# Patient Record
Sex: FEMALE | Race: BLACK OR AFRICAN AMERICAN | URBAN - METROPOLITAN AREA
[De-identification: names, ages, dates, MRNs, and addresses within clinical notes are randomized per-mention and may not be internally consistent; named-entity substitution may affect disease eponyms.]

---

## 2021-05-07 ENCOUNTER — OFFICE VISIT (OUTPATIENT)
Dept: URBAN - METROPOLITAN AREA CLINIC 19 | Facility: CLINIC | Age: 46
End: 2021-05-07
Payer: MEDICARE

## 2021-05-07 ENCOUNTER — WEB ENCOUNTER (OUTPATIENT)
Dept: URBAN - METROPOLITAN AREA CLINIC 19 | Facility: CLINIC | Age: 46
End: 2021-05-07

## 2021-05-07 DIAGNOSIS — R14.3 GAS AND BLOATING: ICD-10-CM

## 2021-05-07 DIAGNOSIS — K92.1 BLOOD IN STOOL: ICD-10-CM

## 2021-05-07 DIAGNOSIS — R10.13 EPIGASTRIC PAIN: ICD-10-CM

## 2021-05-07 DIAGNOSIS — R19.7 DIARRHEA: ICD-10-CM

## 2021-05-07 PROBLEM — 62315008 DIARRHEA: Status: ACTIVE | Noted: 2021-05-07

## 2021-05-07 PROBLEM — 271832001 FLATULENCE, ERUCTATION AND GAS PAIN: Status: ACTIVE | Noted: 2021-05-07

## 2021-05-07 PROCEDURE — 99204 OFFICE O/P NEW MOD 45 MIN: CPT | Performed by: INTERNAL MEDICINE

## 2021-05-07 RX ORDER — SODIUM, POTASSIUM,MAG SULFATES 17.5-3.13G
354ML SOLUTION, RECONSTITUTED, ORAL ORAL
Qty: 354 MILLILITER | Refills: 0 | OUTPATIENT
Start: 2021-05-07 | End: 2021-05-08

## 2021-05-07 NOTE — HPI-TODAY'S VISIT:
Patient presents for evaluation of blood in stool.  Patient states that she has seen intermittent blood mixed in the stool for "years", but she has noticed blood in every BM this week.  She has been told that she has hemorrhoids, but she is not having severe anal pruritus or pain at this time.  She has epigastric pain and takes daily omeprazole for presumed GERD - has some relief.  She also complains of excessive gas.  She states that she had an operation "years ago" to remove a cyst from her intestine.  She has chronic anemia, and she is taking oral iron therapy.

## 2021-05-09 PROBLEM — 79922009 EPIGASTRIC PAIN: Status: ACTIVE | Noted: 2021-05-07

## 2021-05-09 PROBLEM — 405729008 BLOOD IN STOOL: Status: ACTIVE | Noted: 2021-05-07

## 2021-05-14 ENCOUNTER — OFFICE VISIT (OUTPATIENT)
Dept: URBAN - METROPOLITAN AREA MEDICAL CENTER 25 | Facility: MEDICAL CENTER | Age: 46
End: 2021-05-14

## 2021-05-28 ENCOUNTER — OFFICE VISIT (OUTPATIENT)
Dept: URBAN - METROPOLITAN AREA MEDICAL CENTER 25 | Facility: MEDICAL CENTER | Age: 46
End: 2021-05-28
Payer: MEDICARE

## 2021-05-28 ENCOUNTER — LAB OUTSIDE AN ENCOUNTER (OUTPATIENT)
Dept: URBAN - METROPOLITAN AREA CLINIC 19 | Facility: CLINIC | Age: 46
End: 2021-05-28

## 2021-05-28 DIAGNOSIS — R19.7 ACUTE DIARRHEA: ICD-10-CM

## 2021-05-28 DIAGNOSIS — K20.80 ESOPHAGITIS, LOS ANGELES GRADE A: ICD-10-CM

## 2021-05-28 DIAGNOSIS — K29.80 ACUTE DUODENITIS: ICD-10-CM

## 2021-05-28 DIAGNOSIS — K62.5 ANAL BLEEDING: ICD-10-CM

## 2021-05-28 DIAGNOSIS — D12.0 ADENOMA OF CECUM: ICD-10-CM

## 2021-05-28 DIAGNOSIS — B96.81 BACTERIAL INFECTION DUE TO H. PYLORI: ICD-10-CM

## 2021-05-28 DIAGNOSIS — K29.60 ADENOPAPILLOMATOSIS GASTRICA: ICD-10-CM

## 2021-05-28 LAB
GLUCOSE POC: 192
GLUCOSE POC: 239
PERFORMING LAB: (no result)
PERFORMING LAB: (no result)

## 2021-05-28 PROCEDURE — 43239 EGD BIOPSY SINGLE/MULTIPLE: CPT | Performed by: INTERNAL MEDICINE

## 2021-05-28 PROCEDURE — 45380 COLONOSCOPY AND BIOPSY: CPT | Performed by: INTERNAL MEDICINE

## 2021-06-04 ENCOUNTER — TELEPHONE ENCOUNTER (OUTPATIENT)
Dept: URBAN - METROPOLITAN AREA CLINIC 19 | Facility: CLINIC | Age: 46
End: 2021-06-04

## 2021-06-04 RX ORDER — OMEPRAZOLE 40 MG/1
1 CAPSULE 30 MINUTES BEFORE BREAKFAST AND DINNER CAPSULE, DELAYED RELEASE ORAL BID
Qty: 28 | Refills: 0

## 2021-06-04 RX ORDER — AMOXICILLIN 500 MG/1
2 TABLETS TABLET, FILM COATED ORAL TWICE A DAY
Qty: 56 TABLET | Refills: 0 | OUTPATIENT
Start: 2021-06-09 | End: 2021-06-23

## 2021-06-04 RX ORDER — CLARITHROMYCIN 500 MG/1
1 TABLET TABLET, FILM COATED ORAL BID
Qty: 28 TABLET | Refills: 0 | OUTPATIENT
Start: 2021-06-09 | End: 2021-06-23

## 2021-06-09 ENCOUNTER — TELEPHONE ENCOUNTER (OUTPATIENT)
Dept: URBAN - METROPOLITAN AREA CLINIC 19 | Facility: CLINIC | Age: 46
End: 2021-06-09

## 2021-06-09 PROBLEM — 307759003 HELICOBACTER PYLORI GASTROINTESTINAL TRACT INFECTION: Status: ACTIVE | Noted: 2021-06-09

## 2021-06-09 RX ORDER — CLARITHROMYCIN 500 MG/1
1 TABLET TABLET, FILM COATED ORAL BID
Qty: 28 TABLET | Refills: 0
Start: 2021-06-09 | End: 2021-06-23

## 2021-10-01 ENCOUNTER — OFFICE VISIT (OUTPATIENT)
Dept: URBAN - METROPOLITAN AREA CLINIC 19 | Facility: CLINIC | Age: 46
End: 2021-10-01

## 2022-05-12 ENCOUNTER — OFFICE VISIT (OUTPATIENT)
Dept: URBAN - METROPOLITAN AREA CLINIC 19 | Facility: CLINIC | Age: 47
End: 2022-05-12

## 2022-05-12 RX ORDER — OMEPRAZOLE 40 MG/1
1 CAPSULE 30 MINUTES BEFORE BREAKFAST AND DINNER CAPSULE, DELAYED RELEASE ORAL BID
Qty: 28 | Refills: 0 | Status: ACTIVE | COMMUNITY

## 2022-10-10 ENCOUNTER — OUT OF OFFICE VISIT (OUTPATIENT)
Dept: URBAN - METROPOLITAN AREA MEDICAL CENTER 25 | Facility: MEDICAL CENTER | Age: 47
End: 2022-10-10
Payer: MEDICARE

## 2022-10-10 DIAGNOSIS — R11.0 AM NAUSEA: ICD-10-CM

## 2022-10-10 DIAGNOSIS — D50.9 ANEMIA: ICD-10-CM

## 2022-10-10 DIAGNOSIS — R10.32 ABDOMINAL CRAMPING IN LEFT LOWER QUADRANT: ICD-10-CM

## 2022-10-10 DIAGNOSIS — K62.5 ANAL BLEEDING: ICD-10-CM

## 2022-10-10 DIAGNOSIS — D64.89 ANEMIA DUE TO OTHER CAUSE: ICD-10-CM

## 2022-10-10 DIAGNOSIS — R19.4 ALTERATION IN BOWEL ELIMINATION: ICD-10-CM

## 2022-10-10 PROCEDURE — G8427 DOCREV CUR MEDS BY ELIG CLIN: HCPCS | Performed by: INTERNAL MEDICINE

## 2022-10-10 PROCEDURE — 99232 SBSQ HOSP IP/OBS MODERATE 35: CPT | Performed by: INTERNAL MEDICINE

## 2022-10-10 PROCEDURE — 99222 1ST HOSP IP/OBS MODERATE 55: CPT | Performed by: INTERNAL MEDICINE

## 2022-10-12 ENCOUNTER — OUT OF OFFICE VISIT (OUTPATIENT)
Dept: URBAN - METROPOLITAN AREA MEDICAL CENTER 25 | Facility: MEDICAL CENTER | Age: 47
End: 2022-10-12
Payer: MEDICARE

## 2022-10-12 DIAGNOSIS — D50.9 ANEMIA: ICD-10-CM

## 2022-10-12 PROCEDURE — 91110 GI TRC IMG INTRAL ESOPH-ILE: CPT | Performed by: INTERNAL MEDICINE

## 2022-10-14 ENCOUNTER — OUT OF OFFICE VISIT (OUTPATIENT)
Dept: URBAN - METROPOLITAN AREA MEDICAL CENTER 25 | Facility: MEDICAL CENTER | Age: 47
End: 2022-10-14
Payer: MEDICARE

## 2022-10-14 ENCOUNTER — LAB OUTSIDE AN ENCOUNTER (OUTPATIENT)
Dept: URBAN - METROPOLITAN AREA CLINIC 19 | Facility: CLINIC | Age: 47
End: 2022-10-14

## 2022-10-14 DIAGNOSIS — K62.5 ANAL BLEEDING: ICD-10-CM

## 2022-10-14 DIAGNOSIS — R11.0 AM NAUSEA: ICD-10-CM

## 2022-10-14 DIAGNOSIS — D50.9 ANEMIA: ICD-10-CM

## 2022-10-14 PROCEDURE — 99232 SBSQ HOSP IP/OBS MODERATE 35: CPT | Performed by: INTERNAL MEDICINE

## 2022-10-16 ENCOUNTER — OUT OF OFFICE VISIT (OUTPATIENT)
Dept: URBAN - METROPOLITAN AREA MEDICAL CENTER 25 | Facility: MEDICAL CENTER | Age: 47
End: 2022-10-16
Payer: MEDICARE

## 2022-10-16 DIAGNOSIS — D50.0 ANEMIA: ICD-10-CM

## 2022-10-16 DIAGNOSIS — K92.1 ACUTE MELENA: ICD-10-CM

## 2022-10-16 DIAGNOSIS — K31.89 ACQUIRED DEFORMITY OF DUODENUM: ICD-10-CM

## 2022-10-16 DIAGNOSIS — K62.5 ANAL BLEEDING: ICD-10-CM

## 2022-10-16 DIAGNOSIS — D50.9 ANEMIA: ICD-10-CM

## 2022-10-16 PROCEDURE — 992 APS NON BILLABLE: Performed by: PHYSICIAN ASSISTANT

## 2022-10-16 PROCEDURE — 45378 DIAGNOSTIC COLONOSCOPY: CPT | Performed by: INTERNAL MEDICINE

## 2022-10-16 PROCEDURE — 43239 EGD BIOPSY SINGLE/MULTIPLE: CPT | Performed by: INTERNAL MEDICINE

## 2022-10-24 ENCOUNTER — TELEPHONE ENCOUNTER (OUTPATIENT)
Dept: URBAN - METROPOLITAN AREA CLINIC 19 | Facility: CLINIC | Age: 47
End: 2022-10-24

## 2023-01-03 ENCOUNTER — OFFICE VISIT (OUTPATIENT)
Dept: URBAN - METROPOLITAN AREA CLINIC 19 | Facility: CLINIC | Age: 48
End: 2023-01-03

## 2023-01-03 NOTE — HPI-TODAY'S VISIT:
She was admitted to the hospital 10/10/2022 for increased weakness and dizziness, hemoglobin 7.7. Colonoscopy 10/16/2022 with Dr. VENEGAS showed bleeding external and internal hemorrhoids EGD 10/16/2022 normal,  Duodenal biopsy negative  Capsule endoscopy 10/12/2022 normal gastric transit time of 1 hour 37 minutes unclear if the capsule left the small bowel as there was small bowel fluid in the lumen, 1 small spot of nonspecific erythema visualized portion of the small bowel was normal KUB capsule not seen     possibly repeat EGD colonoscopy versus iron

## 2024-07-01 ENCOUNTER — CLAIMS CREATED FROM THE CLAIM WINDOW (OUTPATIENT)
Dept: URBAN - METROPOLITAN AREA MEDICAL CENTER 25 | Facility: MEDICAL CENTER | Age: 49
End: 2024-07-01
Payer: MEDICARE

## 2024-07-01 DIAGNOSIS — D62 ACUTE BLOOD LOSS ANEMIA: ICD-10-CM

## 2024-07-01 DIAGNOSIS — K62.5 RECTAL BLEEDING: ICD-10-CM

## 2024-07-01 PROCEDURE — G8427 DOCREV CUR MEDS BY ELIG CLIN: HCPCS | Performed by: INTERNAL MEDICINE

## 2024-07-01 PROCEDURE — 99254 IP/OBS CNSLTJ NEW/EST MOD 60: CPT | Performed by: INTERNAL MEDICINE

## 2024-07-01 PROCEDURE — 99222 1ST HOSP IP/OBS MODERATE 55: CPT | Performed by: INTERNAL MEDICINE

## 2024-07-02 ENCOUNTER — CLAIMS CREATED FROM THE CLAIM WINDOW (OUTPATIENT)
Dept: URBAN - METROPOLITAN AREA MEDICAL CENTER 25 | Facility: MEDICAL CENTER | Age: 49
End: 2024-07-02
Payer: MEDICARE

## 2024-07-02 DIAGNOSIS — D62 ACUTE BLOOD LOSS ANEMIA: ICD-10-CM

## 2024-07-02 DIAGNOSIS — K62.5 RECTAL BLEEDING: ICD-10-CM

## 2024-07-02 PROCEDURE — 99232 SBSQ HOSP IP/OBS MODERATE 35: CPT | Performed by: INTERNAL MEDICINE
